# Patient Record
Sex: FEMALE | Race: WHITE | NOT HISPANIC OR LATINO
[De-identification: names, ages, dates, MRNs, and addresses within clinical notes are randomized per-mention and may not be internally consistent; named-entity substitution may affect disease eponyms.]

---

## 2024-01-10 ENCOUNTER — APPOINTMENT (OUTPATIENT)
Dept: ORTHOPEDIC SURGERY | Facility: CLINIC | Age: 54
End: 2024-01-10
Payer: COMMERCIAL

## 2024-01-10 ENCOUNTER — NON-APPOINTMENT (OUTPATIENT)
Age: 54
End: 2024-01-10

## 2024-01-10 DIAGNOSIS — M17.0 BILATERAL PRIMARY OSTEOARTHRITIS OF KNEE: ICD-10-CM

## 2024-01-10 DIAGNOSIS — M25.561 PAIN IN RIGHT KNEE: ICD-10-CM

## 2024-01-10 DIAGNOSIS — G89.29 PAIN IN RIGHT KNEE: ICD-10-CM

## 2024-01-10 DIAGNOSIS — M25.562 PAIN IN RIGHT KNEE: ICD-10-CM

## 2024-01-10 PROBLEM — Z00.00 ENCOUNTER FOR PREVENTIVE HEALTH EXAMINATION: Status: ACTIVE | Noted: 2024-01-10

## 2024-01-10 PROCEDURE — 99204 OFFICE O/P NEW MOD 45 MIN: CPT | Mod: 25

## 2024-01-10 PROCEDURE — 73564 X-RAY EXAM KNEE 4 OR MORE: CPT | Mod: 50

## 2024-01-10 PROCEDURE — 20610 DRAIN/INJ JOINT/BURSA W/O US: CPT | Mod: 50

## 2024-01-15 PROBLEM — M17.0 PRIMARY OSTEOARTHRITIS OF BOTH KNEES: Status: ACTIVE | Noted: 2024-01-15

## 2024-01-15 NOTE — DISCUSSION/SUMMARY
[de-identified] : 53-year-old female with chronic bilateral knee pain.  Her symptoms are secondary to degenerative arthritis. I discussed the treatment of degenerative arthritis with the patient at length today. I described the spectrum of treatment from nonoperative modalities to total joint arthroplasty. Noninvasive and nonoperative treatment modalities include weight reduction, activity modification with low impact exercise, PRN use of acetaminophen or anti-inflammatory medication if tolerated, glucosamine/chondroitin supplements, and physical therapy. Further treatments can include corticosteroid injection and the use of hyaluronic acid injections.  Bilateral cortisone injection performed.  I recommended home exercise program.  Follow-up 3 months

## 2024-01-15 NOTE — PHYSICAL EXAM
[Normal] : Gait: normal [de-identified] : General: No acute distress Mental: Alert and oriented x3 Eyes: Conjunctivitis not seen Chest: Symmetric chest rise, no audible wheezing Skin: Bilateral lower extremities absent from rashes and ulcers Abdomen: No distention  Right knee: Skin: Clean, dry, intact  Inspection: No obvious malalignment, no masses, no swelling, no effusion.  Tenderness: positive MJLT. no LJLT. No tenderness over the medial and lateral patella facets. No ttp medial/lateral epicondyle, patella tendon, tibial tubercle, pes anserinus, or proximal fibula.  ROM: 0 to 130 pain with deep flexion in both knees  Stability: Stable to varus and valgus, negative lachman. Negative anterior/posterior drawer.  Additional tests: Negative McMurrays test, negative Steinmann, Negative patellar grind test.   Strength: 5/5 Q/H/TA/GS/EHL, no atrophy  Neuro: Sensation intact to light touch throughout in dp/sp/tib/bert/saph nerve distributions Pulses: 2+ DP/PT pulses.  Left knee: Skin: Clean, dry, intact  Inspection: No obvious malalignment, no masses, no swelling, no effusion.  Tenderness: positive MJLT. no LJLT. No tenderness over the medial and lateral patella facets. No ttp medial/lateral epicondyle, patella tendon, tibial tubercle, pes anserinus, or proximal fibula.  ROM: 0 to 130 pain with deep flexion in both knees  Stability: Stable to varus and valgus, negative lachman. Negative anterior/posterior drawer.  Additional tests: Negative McMurrays test, negative Steinmann, Negative patellar grind test.   Strength: 5/5 Q/H/TA/GS/EHL, no atrophy  Neuro: Sensation intact to light touch throughout in dp/sp/tib/bert/saph nerve distributions Pulses: 2+ DP/PT pulses. [de-identified] : X-rays of the right knee including 4 views shows neutral alignment, moderate narrowing of the medial and lateral joint space with subchondral sclerosis and small osteophytes, patellofemoral narrowing, patella is at appropriate height and central position.  Kellgren-Romain 3  X-rays of the left knee  including 4 views shows neutral alignment, moderate narrowing of the medial and lateral joint space with subchondral sclerosis and small osteophytes, patellofemoral narrowing, patella is at appropriate height and central position.  Kellgren-Romain 3

## 2024-01-15 NOTE — PROCEDURE
[de-identified] : Injection: Right knee joint. Indication: Arthritis  A discussion was had with the patient regarding this procedure and all questions were answered. All risks, benefits and alternatives were discussed. These included but were not limited to bleeding, infection, and allergic reaction. Alcohol was used to clean the skin, and betadine was used to sterilize and prep the area in the lateral aspect of the right knee. Ethyl chloride spray was then used as a topical anesthetic. A 22-gauge needle was used to inject 4cc of 2% lidocaine and 1cc of 40mg Kenalog into the knee. A sterile bandage was then applied. The patient tolerated the procedure well and there were no complications.  Injection: Left knee joint. Indication: Arthritis  A discussion was had with the patient regarding this procedure and all questions were answered. All risks, benefits and alternatives were discussed. These included but were not limited to bleeding, infection, and allergic reaction. Alcohol was used to clean the skin, and betadine was used to sterilize and prep the area in the lateral aspect of the left knee. Ethyl chloride spray was then used as a topical anesthetic. A 22-gauge needle was used to inject 4cc of 2% lidocaine and 1cc of 40mg Kenalog into the knee. A sterile bandage was then applied. The patient tolerated the procedure well and there were no complications.

## 2024-01-15 NOTE — HISTORY OF PRESENT ILLNESS
[de-identified] : 53 female with bilateral knee pain over the past 1 month, severe in intensity.  Right is worse than left.  No recent injuries.  Pain is along the medial aspect, associated with swelling and stiffness.  She has experienced popping in the past but not recently.  The pain is increased with squatting, sleeping, and kneeling.  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  Denies radiating pain distally, denies numbness or tingling.  She has been treating this with Tylenol.  Denies history of knee surgery or injections.

## 2024-04-16 ENCOUNTER — APPOINTMENT (OUTPATIENT)
Dept: ORTHOPEDIC SURGERY | Facility: CLINIC | Age: 54
End: 2024-04-16